# Patient Record
Sex: MALE | Race: WHITE | NOT HISPANIC OR LATINO | URBAN - METROPOLITAN AREA
[De-identification: names, ages, dates, MRNs, and addresses within clinical notes are randomized per-mention and may not be internally consistent; named-entity substitution may affect disease eponyms.]

---

## 2021-11-15 PROBLEM — E11.9: Noted: 2021-11-15

## 2021-11-15 PROBLEM — L71.9: Noted: 2021-11-15

## 2021-11-15 PROBLEM — H10.89: Noted: 2021-11-15

## 2021-11-15 PROBLEM — Z79.60: Noted: 2021-11-15

## 2022-03-19 PROBLEM — R07.9 CHEST PAIN: Status: ACTIVE | Noted: 2017-08-07

## 2022-03-19 PROBLEM — N20.0 NEPHROLITHIASIS: Status: ACTIVE | Noted: 2018-03-19

## 2022-11-14 ENCOUNTER — COMPREHENSIVE EXAM (OUTPATIENT)
Dept: URBAN - METROPOLITAN AREA CLINIC 1 | Facility: CLINIC | Age: 52
End: 2022-11-14

## 2022-11-14 DIAGNOSIS — Z79.60: ICD-10-CM

## 2022-11-14 DIAGNOSIS — H25.813: ICD-10-CM

## 2022-11-14 DIAGNOSIS — H10.89: ICD-10-CM

## 2022-11-14 DIAGNOSIS — E11.9: ICD-10-CM

## 2022-11-14 DIAGNOSIS — L71.9: ICD-10-CM

## 2022-11-14 PROCEDURE — 92015 DETERMINE REFRACTIVE STATE: CPT

## 2022-11-14 PROCEDURE — 92014 COMPRE OPH EXAM EST PT 1/>: CPT

## 2022-11-14 ASSESSMENT — TONOMETRY
OS_IOP_MMHG: 16
OD_IOP_MMHG: 16

## 2022-11-14 ASSESSMENT — VISUAL ACUITY
OS_CC: 20/20-1
OD_CC: 20/20

## 2024-10-01 ENCOUNTER — HOSPITAL ENCOUNTER (INPATIENT)
Facility: HOSPITAL | Age: 54
LOS: 1 days | Discharge: HOME | DRG: 700 | End: 2024-10-02
Attending: INTERNAL MEDICINE | Admitting: INTERNAL MEDICINE
Payer: COMMERCIAL

## 2024-10-01 DIAGNOSIS — R80.1 PERSISTENT PROTEINURIA: ICD-10-CM

## 2024-10-01 DIAGNOSIS — N18.2 CHRONIC KIDNEY DISEASE, STAGE II (MILD): ICD-10-CM

## 2024-10-01 DIAGNOSIS — G89.18 POST-OPERATIVE PAIN: Primary | ICD-10-CM

## 2024-10-01 PROBLEM — R80.9 ASYMPTOMATIC PROTEINURIA: Status: ACTIVE | Noted: 2024-10-01

## 2024-10-01 PROBLEM — N19 RENAL FAILURE: Status: ACTIVE | Noted: 2024-10-01

## 2024-10-01 LAB
ANION GAP SERPL CALC-SCNC: 6 MMOL/L (ref 3–18)
APTT PPP: 26.9 SEC (ref 23–36.4)
BASOPHILS # BLD: 0.1 K/UL (ref 0–0.1)
BASOPHILS NFR BLD: 1 % (ref 0–2)
BUN SERPL-MCNC: 25 MG/DL (ref 7–18)
BUN/CREAT SERPL: 24 (ref 12–20)
CALCIUM SERPL-MCNC: 9.3 MG/DL (ref 8.5–10.1)
CHLORIDE SERPL-SCNC: 103 MMOL/L (ref 100–111)
CO2 SERPL-SCNC: 27 MMOL/L (ref 21–32)
CREAT SERPL-MCNC: 1.03 MG/DL (ref 0.6–1.3)
DIFFERENTIAL METHOD BLD: ABNORMAL
EOSINOPHIL # BLD: 0.3 K/UL (ref 0–0.4)
EOSINOPHIL NFR BLD: 4 % (ref 0–5)
ERYTHROCYTE [DISTWIDTH] IN BLOOD BY AUTOMATED COUNT: 12 % (ref 11.6–14.5)
GLUCOSE BLD STRIP.AUTO-MCNC: 145 MG/DL (ref 70–110)
GLUCOSE BLD STRIP.AUTO-MCNC: 217 MG/DL (ref 70–110)
GLUCOSE SERPL-MCNC: 204 MG/DL (ref 74–99)
HCT VFR BLD AUTO: 49.5 % (ref 36–48)
HCT VFR BLD AUTO: 50.7 % (ref 36–48)
HGB BLD-MCNC: 16.9 G/DL (ref 13–16)
HGB BLD-MCNC: 17.4 G/DL (ref 13–16)
IMM GRANULOCYTES # BLD AUTO: 0 K/UL (ref 0–0.04)
IMM GRANULOCYTES NFR BLD AUTO: 1 % (ref 0–0.5)
INR PPP: 0.9 (ref 0.9–1.1)
LYMPHOCYTES # BLD: 2.1 K/UL (ref 0.9–3.6)
LYMPHOCYTES NFR BLD: 26 % (ref 21–52)
MCH RBC QN AUTO: 29 PG (ref 24–34)
MCHC RBC AUTO-ENTMCNC: 34.1 G/DL (ref 31–37)
MCV RBC AUTO: 84.9 FL (ref 78–100)
MONOCYTES # BLD: 0.6 K/UL (ref 0.05–1.2)
MONOCYTES NFR BLD: 7 % (ref 3–10)
NEUTS SEG # BLD: 5.2 K/UL (ref 1.8–8)
NEUTS SEG NFR BLD: 62 % (ref 40–73)
NRBC # BLD: 0 K/UL (ref 0–0.01)
NRBC BLD-RTO: 0 PER 100 WBC
PLATELET # BLD AUTO: 309 K/UL (ref 135–420)
PMV BLD AUTO: 9.6 FL (ref 9.2–11.8)
POTASSIUM SERPL-SCNC: 4 MMOL/L (ref 3.5–5.5)
PROTHROMBIN TIME: 12.3 SEC (ref 11.9–14.9)
RBC # BLD AUTO: 5.83 M/UL (ref 4.35–5.65)
SODIUM SERPL-SCNC: 136 MMOL/L (ref 136–145)
WBC # BLD AUTO: 8.3 K/UL (ref 4.6–13.2)

## 2024-10-01 PROCEDURE — G0378 HOSPITAL OBSERVATION PER HR: HCPCS

## 2024-10-01 PROCEDURE — 88346 IMFLUOR 1ST 1ANTB STAIN PX: CPT

## 2024-10-01 PROCEDURE — 2580000003 HC RX 258: Performed by: INTERNAL MEDICINE

## 2024-10-01 PROCEDURE — 2709999900 CT BIOPSY RENAL

## 2024-10-01 PROCEDURE — 82962 GLUCOSE BLOOD TEST: CPT

## 2024-10-01 PROCEDURE — 88348 ELECTRON MICROSCOPY DX: CPT

## 2024-10-01 PROCEDURE — 85610 PROTHROMBIN TIME: CPT

## 2024-10-01 PROCEDURE — 80048 BASIC METABOLIC PNL TOTAL CA: CPT

## 2024-10-01 PROCEDURE — 6370000000 HC RX 637 (ALT 250 FOR IP): Performed by: INTERNAL MEDICINE

## 2024-10-01 PROCEDURE — 88305 TISSUE EXAM BY PATHOLOGIST: CPT

## 2024-10-01 PROCEDURE — 6360000002 HC RX W HCPCS: Performed by: INTERNAL MEDICINE

## 2024-10-01 PROCEDURE — 0TB13ZX EXCISION OF LEFT KIDNEY, PERCUTANEOUS APPROACH, DIAGNOSTIC: ICD-10-PCS | Performed by: STUDENT IN AN ORGANIZED HEALTH CARE EDUCATION/TRAINING PROGRAM

## 2024-10-01 PROCEDURE — 85014 HEMATOCRIT: CPT

## 2024-10-01 PROCEDURE — 99223 1ST HOSP IP/OBS HIGH 75: CPT | Performed by: INTERNAL MEDICINE

## 2024-10-01 PROCEDURE — 88350 IMFLUOR EA ADDL 1ANTB STN PX: CPT

## 2024-10-01 PROCEDURE — 6360000002 HC RX W HCPCS: Performed by: STUDENT IN AN ORGANIZED HEALTH CARE EDUCATION/TRAINING PROGRAM

## 2024-10-01 PROCEDURE — 88313 SPECIAL STAINS GROUP 2: CPT

## 2024-10-01 PROCEDURE — 1100000000 HC RM PRIVATE

## 2024-10-01 PROCEDURE — 85025 COMPLETE CBC W/AUTO DIFF WBC: CPT

## 2024-10-01 PROCEDURE — 2580000003 HC RX 258: Performed by: RADIOLOGY

## 2024-10-01 PROCEDURE — 36415 COLL VENOUS BLD VENIPUNCTURE: CPT

## 2024-10-01 PROCEDURE — 85018 HEMOGLOBIN: CPT

## 2024-10-01 PROCEDURE — 85730 THROMBOPLASTIN TIME PARTIAL: CPT

## 2024-10-01 RX ORDER — GABAPENTIN 100 MG/1
100 CAPSULE ORAL 3 TIMES DAILY
Status: DISCONTINUED | OUTPATIENT
Start: 2024-10-01 | End: 2024-10-02 | Stop reason: HOSPADM

## 2024-10-01 RX ORDER — TAMSULOSIN HYDROCHLORIDE 0.4 MG/1
0.4 CAPSULE ORAL DAILY
Status: DISCONTINUED | OUTPATIENT
Start: 2024-10-01 | End: 2024-10-02 | Stop reason: HOSPADM

## 2024-10-01 RX ORDER — FENTANYL CITRATE 50 UG/ML
INJECTION, SOLUTION INTRAMUSCULAR; INTRAVENOUS PRN
Status: COMPLETED | OUTPATIENT
Start: 2024-10-01 | End: 2024-10-01

## 2024-10-01 RX ORDER — MAGNESIUM SULFATE IN WATER 40 MG/ML
2000 INJECTION, SOLUTION INTRAVENOUS PRN
Status: DISCONTINUED | OUTPATIENT
Start: 2024-10-01 | End: 2024-10-02 | Stop reason: HOSPADM

## 2024-10-01 RX ORDER — VITAMIN B COMPLEX
5000 TABLET ORAL DAILY
Status: DISCONTINUED | OUTPATIENT
Start: 2024-10-01 | End: 2024-10-02 | Stop reason: HOSPADM

## 2024-10-01 RX ORDER — ATORVASTATIN CALCIUM 40 MG/1
40 TABLET, FILM COATED ORAL DAILY
Status: DISCONTINUED | OUTPATIENT
Start: 2024-10-01 | End: 2024-10-02 | Stop reason: HOSPADM

## 2024-10-01 RX ORDER — POTASSIUM CHLORIDE 7.45 MG/ML
10 INJECTION INTRAVENOUS PRN
Status: DISCONTINUED | OUTPATIENT
Start: 2024-10-01 | End: 2024-10-02 | Stop reason: HOSPADM

## 2024-10-01 RX ORDER — ONDANSETRON 2 MG/ML
4 INJECTION INTRAMUSCULAR; INTRAVENOUS EVERY 6 HOURS PRN
Status: DISCONTINUED | OUTPATIENT
Start: 2024-10-01 | End: 2024-10-02 | Stop reason: HOSPADM

## 2024-10-01 RX ORDER — POLYETHYLENE GLYCOL 3350 17 G/17G
17 POWDER, FOR SOLUTION ORAL DAILY PRN
Status: DISCONTINUED | OUTPATIENT
Start: 2024-10-01 | End: 2024-10-02 | Stop reason: HOSPADM

## 2024-10-01 RX ORDER — AMLODIPINE BESYLATE 10 MG/1
10 TABLET ORAL DAILY
Status: DISCONTINUED | OUTPATIENT
Start: 2024-10-01 | End: 2024-10-02 | Stop reason: HOSPADM

## 2024-10-01 RX ORDER — SODIUM CHLORIDE 9 MG/ML
INJECTION, SOLUTION INTRAVENOUS CONTINUOUS
Status: DISCONTINUED | OUTPATIENT
Start: 2024-10-01 | End: 2024-10-01 | Stop reason: HOSPADM

## 2024-10-01 RX ORDER — DEXTROSE MONOHYDRATE 100 MG/ML
INJECTION, SOLUTION INTRAVENOUS CONTINUOUS PRN
Status: DISCONTINUED | OUTPATIENT
Start: 2024-10-01 | End: 2024-10-02 | Stop reason: HOSPADM

## 2024-10-01 RX ORDER — ACETAMINOPHEN 325 MG/1
650 TABLET ORAL EVERY 6 HOURS PRN
Status: DISCONTINUED | OUTPATIENT
Start: 2024-10-01 | End: 2024-10-02 | Stop reason: HOSPADM

## 2024-10-01 RX ORDER — ACETAMINOPHEN 650 MG/1
650 SUPPOSITORY RECTAL EVERY 6 HOURS PRN
Status: DISCONTINUED | OUTPATIENT
Start: 2024-10-01 | End: 2024-10-02 | Stop reason: HOSPADM

## 2024-10-01 RX ORDER — FENTANYL CITRATE 50 UG/ML
INJECTION, SOLUTION INTRAMUSCULAR; INTRAVENOUS
Status: DISCONTINUED
Start: 2024-10-01 | End: 2024-10-01

## 2024-10-01 RX ORDER — POTASSIUM CHLORIDE 1500 MG/1
40 TABLET, EXTENDED RELEASE ORAL PRN
Status: DISCONTINUED | OUTPATIENT
Start: 2024-10-01 | End: 2024-10-02 | Stop reason: HOSPADM

## 2024-10-01 RX ORDER — INSULIN LISPRO 100 [IU]/ML
0-4 INJECTION, SOLUTION INTRAVENOUS; SUBCUTANEOUS NIGHTLY
Status: DISCONTINUED | OUTPATIENT
Start: 2024-10-01 | End: 2024-10-02 | Stop reason: HOSPADM

## 2024-10-01 RX ORDER — CHLORTHALIDONE 25 MG/1
25 TABLET ORAL DAILY
Status: DISCONTINUED | OUTPATIENT
Start: 2024-10-01 | End: 2024-10-02 | Stop reason: HOSPADM

## 2024-10-01 RX ORDER — MIDAZOLAM HYDROCHLORIDE 2 MG/2ML
INJECTION, SOLUTION INTRAMUSCULAR; INTRAVENOUS PRN
Status: COMPLETED | OUTPATIENT
Start: 2024-10-01 | End: 2024-10-01

## 2024-10-01 RX ORDER — SODIUM CHLORIDE 0.9 % (FLUSH) 0.9 %
5-40 SYRINGE (ML) INJECTION PRN
Status: DISCONTINUED | OUTPATIENT
Start: 2024-10-01 | End: 2024-10-02 | Stop reason: HOSPADM

## 2024-10-01 RX ORDER — SODIUM CHLORIDE 0.9 % (FLUSH) 0.9 %
5-40 SYRINGE (ML) INJECTION EVERY 12 HOURS SCHEDULED
Status: DISCONTINUED | OUTPATIENT
Start: 2024-10-01 | End: 2024-10-02 | Stop reason: HOSPADM

## 2024-10-01 RX ORDER — LETROZOLE 2.5 MG/1
2.5 TABLET, FILM COATED ORAL DAILY
Status: DISCONTINUED | OUTPATIENT
Start: 2024-10-01 | End: 2024-10-02 | Stop reason: HOSPADM

## 2024-10-01 RX ORDER — INSULIN LISPRO 100 [IU]/ML
0-8 INJECTION, SOLUTION INTRAVENOUS; SUBCUTANEOUS
Status: DISCONTINUED | OUTPATIENT
Start: 2024-10-01 | End: 2024-10-02 | Stop reason: HOSPADM

## 2024-10-01 RX ORDER — HYDRALAZINE HYDROCHLORIDE 20 MG/ML
INJECTION INTRAMUSCULAR; INTRAVENOUS PRN
Status: COMPLETED | OUTPATIENT
Start: 2024-10-01 | End: 2024-10-01

## 2024-10-01 RX ORDER — SODIUM CHLORIDE 9 MG/ML
INJECTION, SOLUTION INTRAVENOUS PRN
Status: DISCONTINUED | OUTPATIENT
Start: 2024-10-01 | End: 2024-10-02 | Stop reason: HOSPADM

## 2024-10-01 RX ORDER — CLONIDINE HYDROCHLORIDE 0.1 MG/1
0.1 TABLET ORAL 2 TIMES DAILY
Status: DISCONTINUED | OUTPATIENT
Start: 2024-10-01 | End: 2024-10-02 | Stop reason: HOSPADM

## 2024-10-01 RX ORDER — DOCUSATE SODIUM 100 MG/1
100 CAPSULE, LIQUID FILLED ORAL DAILY
Status: DISCONTINUED | OUTPATIENT
Start: 2024-10-01 | End: 2024-10-02 | Stop reason: HOSPADM

## 2024-10-01 RX ORDER — HYDRALAZINE HYDROCHLORIDE 20 MG/ML
10 INJECTION INTRAMUSCULAR; INTRAVENOUS EVERY 6 HOURS PRN
Status: DISCONTINUED | OUTPATIENT
Start: 2024-10-01 | End: 2024-10-02 | Stop reason: HOSPADM

## 2024-10-01 RX ORDER — HYDRALAZINE HYDROCHLORIDE 20 MG/ML
INJECTION INTRAMUSCULAR; INTRAVENOUS
Status: DISCONTINUED
Start: 2024-10-01 | End: 2024-10-01

## 2024-10-01 RX ORDER — ONDANSETRON 4 MG/1
4 TABLET, ORALLY DISINTEGRATING ORAL EVERY 8 HOURS PRN
Status: DISCONTINUED | OUTPATIENT
Start: 2024-10-01 | End: 2024-10-02 | Stop reason: HOSPADM

## 2024-10-01 RX ORDER — OXYCODONE AND ACETAMINOPHEN 5; 325 MG/1; MG/1
1 TABLET ORAL EVERY 4 HOURS PRN
Status: DISCONTINUED | OUTPATIENT
Start: 2024-10-01 | End: 2024-10-02 | Stop reason: HOSPADM

## 2024-10-01 RX ORDER — MIDAZOLAM HYDROCHLORIDE 1 MG/ML
INJECTION INTRAMUSCULAR; INTRAVENOUS
Status: DISCONTINUED
Start: 2024-10-01 | End: 2024-10-01

## 2024-10-01 RX ADMIN — MIDAZOLAM HYDROCHLORIDE 1 MG: 1 INJECTION, SOLUTION INTRAMUSCULAR; INTRAVENOUS at 14:09

## 2024-10-01 RX ADMIN — MIDAZOLAM HYDROCHLORIDE 1 MG: 1 INJECTION, SOLUTION INTRAMUSCULAR; INTRAVENOUS at 14:05

## 2024-10-01 RX ADMIN — AMLODIPINE BESYLATE 10 MG: 10 TABLET ORAL at 16:36

## 2024-10-01 RX ADMIN — GABAPENTIN 100 MG: 100 CAPSULE ORAL at 20:57

## 2024-10-01 RX ADMIN — HYDRALAZINE HYDROCHLORIDE 10 MG: 20 INJECTION INTRAMUSCULAR; INTRAVENOUS at 18:12

## 2024-10-01 RX ADMIN — FENTANYL CITRATE 50 MCG: 50 INJECTION INTRAMUSCULAR; INTRAVENOUS at 14:09

## 2024-10-01 RX ADMIN — OXYCODONE HYDROCHLORIDE AND ACETAMINOPHEN 1 TABLET: 5; 325 TABLET ORAL at 18:12

## 2024-10-01 RX ADMIN — ACETAMINOPHEN 325MG 650 MG: 325 TABLET ORAL at 21:00

## 2024-10-01 RX ADMIN — HYDRALAZINE HYDROCHLORIDE 10 MG: 20 INJECTION, SOLUTION INTRAMUSCULAR; INTRAVENOUS at 14:16

## 2024-10-01 RX ADMIN — SODIUM CHLORIDE, PRESERVATIVE FREE 10 ML: 5 INJECTION INTRAVENOUS at 21:02

## 2024-10-01 RX ADMIN — FENTANYL CITRATE 50 MCG: 50 INJECTION INTRAMUSCULAR; INTRAVENOUS at 14:05

## 2024-10-01 RX ADMIN — SODIUM CHLORIDE: 9 INJECTION, SOLUTION INTRAVENOUS at 10:00

## 2024-10-01 RX ADMIN — CLONIDINE HYDROCHLORIDE 0.1 MG: 0.1 TABLET ORAL at 20:57

## 2024-10-01 ASSESSMENT — PAIN DESCRIPTION - ORIENTATION
ORIENTATION: RIGHT;LEFT
ORIENTATION: OTHER (COMMENT)
ORIENTATION: OTHER (COMMENT)

## 2024-10-01 ASSESSMENT — PAIN DESCRIPTION - LOCATION
LOCATION: HEAD
LOCATION: HEAD;NECK;SHOULDER
LOCATION: HEAD;NECK;SHOULDER

## 2024-10-01 ASSESSMENT — PAIN DESCRIPTION - PAIN TYPE
TYPE: ACUTE PAIN
TYPE: ACUTE PAIN

## 2024-10-01 ASSESSMENT — PAIN DESCRIPTION - DESCRIPTORS
DESCRIPTORS: THROBBING
DESCRIPTORS: ACHING;OTHER (COMMENT)
DESCRIPTORS: ACHING;OTHER (COMMENT)

## 2024-10-01 ASSESSMENT — PAIN - FUNCTIONAL ASSESSMENT
PAIN_FUNCTIONAL_ASSESSMENT: ACTIVITIES ARE NOT PREVENTED
PAIN_FUNCTIONAL_ASSESSMENT: 0-10

## 2024-10-01 ASSESSMENT — PAIN SCALES - GENERAL
PAINLEVEL_OUTOF10: 3
PAINLEVEL_OUTOF10: 6
PAINLEVEL_OUTOF10: 0
PAINLEVEL_OUTOF10: 0
PAINLEVEL_OUTOF10: 6
PAINLEVEL_OUTOF10: 6
PAINLEVEL_OUTOF10: 0

## 2024-10-01 ASSESSMENT — PAIN DESCRIPTION - ONSET
ONSET: PROGRESSIVE
ONSET: ON-GOING

## 2024-10-01 ASSESSMENT — PAIN DESCRIPTION - FREQUENCY: FREQUENCY: INTERMITTENT

## 2024-10-01 NOTE — PROGRESS NOTES
4 Eyes Skin Assessment     NAME:  Alfonzo Valencia  YOB: 1970  MEDICAL RECORD NUMBER:  [id number]    The patient is being assessed for  Admission    I agree that at least one RN has performed a thorough Head to Toe Skin Assessment on the patient. ALL assessment sites listed below have been assessed.      Areas assessed by both nurses:    Head, Face, Ears, Shoulders, Back, Chest, Arms, Elbows, Hands, Sacrum. Buttock, Coccyx, Ischium, and Legs. Feet and Heels        Does the Patient have a Wound? No noted wound(s)       Ramirez Prevention initiated by RN: Yes  Wound Care Orders initiated by RN: No    Pressure Injury (Stage 3,4, Unstageable, DTI, NWPT, and Complex wounds) if present, place Wound referral order by RN under : No    New Ostomies, if present place, Ostomy referral order under : No     Nurse 1 eSignature: Electronically signed by Zoie Glass RN on 10/1/24 at 7:21 PM EDT    **SHARE this note so that the co-signing nurse can place an eSignature**    Nurse 2 eSignature: Electronically signed by Eve Glass RN on 10/1/24 at 8:15 PM EDT

## 2024-10-01 NOTE — PLAN OF CARE
Problem: Chronic Conditions and Co-morbidities  Goal: Patient's chronic conditions and co-morbidity symptoms are monitored and maintained or improved  Outcome: Progressing     Problem: Pain  Goal: Verbalizes/displays adequate comfort level or baseline comfort level  Outcome: Progressing     Problem: Skin/Tissue Integrity  Goal: Absence of new skin breakdown  Description: 1.  Monitor for areas of redness and/or skin breakdown  2.  Assess vascular access sites hourly  3.  Every 4-6 hours minimum:  Change oxygen saturation probe site  4.  Every 4-6 hours:  If on nasal continuous positive airway pressure, respiratory therapy assess nares and determine need for appliance change or resting period.    Patient post op Random Renal Biopsy. Monitor access area for bleeding and breakdown  Outcome: Progressing     Problem: Hematologic - Adult  Goal: Maintains hematologic stability  Description: Patient post op from Random renal biopsy.    - Monitor site for Bleeding    - H/H Q6 for 3 occurrences per MD  Outcome: Progressing

## 2024-10-01 NOTE — H&P
History and Physical    Patient: Alfonzo Valencia MRN: [id number]  SSN: [id number]    YOB: 1970    Age: 54 y.o.  Sex: male    Olivia Andrews PA-C    C/C : S/p Renal Biopsy     Subjective:      Alfonzo Valencia is a 54 y.o. male with PMH of  CKD , Renal calculi , DM2, recently diagnosed with nephrotic range of proteinuria by his nephrologist Dr. Erickson , who recommended renal biopsy for further study hence patient received renal biopsy today will be admitting to monitor patient's progress.    Past Medical History:   Diagnosis Date    Bilateral nephrolithiasis     Diabetes mellitus, type II (HCC)     ED (erectile dysfunction)     Hypercholesterolemia     Hyperlipidemia     Hypertension     Hypogonadism male     Kidney calculi     Kidney stone      Past Surgical History:   Procedure Laterality Date    CT BIOPSY RENAL  10/1/2024    CT BIOPSY RENAL 10/1/2024 [facility] RAD CT    ORTHOPEDIC SURGERY      hand surgery    TONSILLECTOMY AND ADENOIDECTOMY      UROLOGICAL SURGERY  2018    S/p Left PCNL.     WISDOM TOOTH EXTRACTION        Family History   Problem Relation Age of Onset    No Known Problems Father     No Known Problems Mother      Social History     Tobacco Use    Smoking status: Former     Current packs/day: 0.00     Types: Cigarettes     Quit date: 2013     Years since quittin.7    Smokeless tobacco: Never   Substance Use Topics    Alcohol use: Yes      Prior to Admission medications    Medication Sig Start Date End Date Taking? Authorizing Provider   amLODIPine (NORVASC) 10 MG tablet Take 1 tablet by mouth daily   Yes Automatic Reconciliation, Ar   atorvastatin (LIPITOR) 40 MG tablet atorvastatin 40 mg tablet   TAKE 1 TABLET BY MOUTH ONCE DAILY   Yes Automatic Reconciliation, Ar   cloNIDine (CATAPRES) 0.1 MG tablet Take 1 tablet by mouth 2 times daily   Yes Automatic Reconciliation, Ar   letrozole (FEMARA) 2.5 MG tablet Take 1 tablet by mouth daily   Yes Automatic Reconciliation, Ar    metFORMIN (GLUCOPHAGE) 1000 MG tablet Take 1 tablet by mouth daily   Yes Automatic Reconciliation, Ar   aliskiren (TEKTURNA) 300 MG tablet Take 300 mg by mouth daily  Patient not taking: Reported on 10/1/2024 12/21/21   Automatic Reconciliation, Ar   anastrozole (ARIMIDEX) 1 MG tablet Take 1 tablet by mouth    Automatic Reconciliation, Ar   chlorthalidone (HYGROTON) 25 MG tablet Take 1 tablet by mouth once daily  Indications: prevention of calcium-containing kidney stones  Patient not taking: Reported on 10/1/2024 6/19/20   Automatic Reconciliation, Ar   vitamin D3 (CHOLECALCIFEROL) 125 MCG (5000 UT) TABS tablet Vitamin D3 125 mcg (5,000 unit) tablet   Take 1 tablet every day by oral route.    Automatic Reconciliation, Ar   dapagliflozin (FARXIGA) 10 MG tablet Take by mouth    Automatic Reconciliation, Ar   docusate (COLACE, DULCOLAX) 100 MG CAPS Take 100 mg by mouth daily  Patient not taking: Reported on 10/1/2024 12/29/21   Automatic Reconciliation, Ar   gabapentin (NEURONTIN) 100 MG capsule gabapentin 100 mg capsule  Patient not taking: Reported on 10/1/2024    Automatic Reconciliation, Ar   hydroCHLOROthiazide (MICROZIDE) 12.5 MG capsule Take 1 capsule by mouth daily    Automatic Reconciliation, Ar   sitaGLIPtan-metFORMIN (JANUMET)  MG per tablet Take 1 tablet by mouth 2 times daily (with meals)    Automatic Reconciliation, Ar   tamsulosin (FLOMAX) 0.4 MG capsule Take 0.4 mg by mouth  Patient not taking: Reported on 10/1/2024 10/15/21   Automatic Reconciliation, Ar        Allergies   Allergen Reactions    Codeine Itching and Rash       Review of Systems:  positive responses in bold type   Constitutional: Negative for fever, chills, diaphoresis and unexpected weight change.   HENT: Negative for ear pain, congestion, sore throat, rhinorrhea, drooling, trouble swallowing, neck pain and tinnitus.   Eyes: Negative for photophobia, pain, redness and visual disturbance.   Respiratory: negative for shortness of

## 2024-10-01 NOTE — PROCEDURES
PROCEDURE NOTE  Date: 10/1/2024   Name: Alfonzo Valencia  YOB: 1970        RADIOLOGY POST PROCEDURE NOTE     October 1, 2024       2:30 PM     Preoperative Diagnosis:   Renal dysfunction    Postoperative Diagnosis:  Same.    :  Philippe Martin MD    Assistant:  None.    Type of Anesthesia: 1% plain lidocaine, moderate sedation    Procedure/Description:  Image-guided random renal biopsy    Findings:   Successful left kidney random renal biopsy, 16g needle, 2 passes.    Estimated blood Loss:  Minimal    Specimen Removed:  Yes    Blood transfusions:  None.    Implants:  None.    Complications: Small left perinephric hematoma    Condition: Stable    Blood thinning medications: Hold for at least 24 hours    Discharge Plan:   Admit for overnight observation    Philippe Martin MD     [id number]

## 2024-10-01 NOTE — PROGRESS NOTES
TRANSFER - OUT REPORT:    Verbal report given to Zoie RIOS on Alfonzo Valencia  being transferred to [facility] for routine post-op       Report consisted of patient's Situation, Background, Assessment and   Recommendations(SBAR).     Information from the following report(s) Nurse Handoff Report was reviewed with the receiving nurse.           Lines:   Peripheral IV 10/01/24 Distal;Posterior;Right Forearm (Active)        Opportunity for questions and clarification was provided.      Patient transported with:  Registered Nurse and Tech

## 2024-10-01 NOTE — PROGRESS NOTES
4 Eyes Skin Assessment     NAME:  Alfonzo Valencia  YOB: 1970  MEDICAL RECORD NUMBER:  [id number]    The patient is being assessed for  Shift Handoff    I agree that at least one RN has performed a thorough Head to Toe Skin Assessment on the patient. ALL assessment sites listed below have been assessed.      Areas assessed by both nurses:    Head, Face, Ears, Shoulders, Back, Chest, Arms, Elbows, Hands, Sacrum. Buttock, Coccyx, Ischium, Legs. Feet and Heels, and Under Medical Devices         Does the Patient have a Wound? No noted wound(s)       Ramirez Prevention initiated by RN: Yes  Wound Care Orders initiated by RN: No    Pressure Injury (Stage 3,4, Unstageable, DTI, NWPT, and Complex wounds) if present, place Wound referral order by RN under : No    New Ostomies, if present place, Ostomy referral order under : No     Nurse 1 eSignature: Electronically signed by Zoie Glass RN on 10/1/24 at 7:21 PM EDT    **SHARE this note so that the co-signing nurse can place an eSignature**    Nurse 2 eSignature: {Esignature:[id number]}

## 2024-10-02 VITALS
BODY MASS INDEX: 32.25 KG/M2 | HEART RATE: 114 BPM | RESPIRATION RATE: 18 BRPM | WEIGHT: 205.47 LBS | TEMPERATURE: 99.1 F | SYSTOLIC BLOOD PRESSURE: 149 MMHG | DIASTOLIC BLOOD PRESSURE: 83 MMHG | OXYGEN SATURATION: 94 % | HEIGHT: 67 IN

## 2024-10-02 LAB
ANION GAP SERPL CALC-SCNC: 10 MMOL/L (ref 3–18)
BUN SERPL-MCNC: 24 MG/DL (ref 7–18)
BUN/CREAT SERPL: 21 (ref 12–20)
CALCIUM SERPL-MCNC: 9.1 MG/DL (ref 8.5–10.1)
CHLORIDE SERPL-SCNC: 106 MMOL/L (ref 100–111)
CO2 SERPL-SCNC: 23 MMOL/L (ref 21–32)
CREAT SERPL-MCNC: 1.16 MG/DL (ref 0.6–1.3)
GLUCOSE BLD STRIP.AUTO-MCNC: 259 MG/DL (ref 70–110)
GLUCOSE SERPL-MCNC: 273 MG/DL (ref 74–99)
HCT VFR BLD AUTO: 47.1 % (ref 36–48)
HGB BLD-MCNC: 16 G/DL (ref 13–16)
POTASSIUM SERPL-SCNC: 3.9 MMOL/L (ref 3.5–5.5)
SODIUM SERPL-SCNC: 139 MMOL/L (ref 136–145)

## 2024-10-02 PROCEDURE — 36415 COLL VENOUS BLD VENIPUNCTURE: CPT

## 2024-10-02 PROCEDURE — 80048 BASIC METABOLIC PNL TOTAL CA: CPT

## 2024-10-02 PROCEDURE — 94761 N-INVAS EAR/PLS OXIMETRY MLT: CPT

## 2024-10-02 PROCEDURE — 85018 HEMOGLOBIN: CPT

## 2024-10-02 PROCEDURE — 99239 HOSP IP/OBS DSCHRG MGMT >30: CPT | Performed by: INTERNAL MEDICINE

## 2024-10-02 PROCEDURE — 6360000002 HC RX W HCPCS: Performed by: INTERNAL MEDICINE

## 2024-10-02 PROCEDURE — 96374 THER/PROPH/DIAG INJ IV PUSH: CPT

## 2024-10-02 PROCEDURE — 6370000000 HC RX 637 (ALT 250 FOR IP): Performed by: INTERNAL MEDICINE

## 2024-10-02 PROCEDURE — G0378 HOSPITAL OBSERVATION PER HR: HCPCS

## 2024-10-02 PROCEDURE — 2580000003 HC RX 258: Performed by: INTERNAL MEDICINE

## 2024-10-02 PROCEDURE — 85014 HEMATOCRIT: CPT

## 2024-10-02 PROCEDURE — 82962 GLUCOSE BLOOD TEST: CPT

## 2024-10-02 RX ORDER — OXYCODONE AND ACETAMINOPHEN 5; 325 MG/1; MG/1
1 TABLET ORAL EVERY 8 HOURS PRN
Qty: 6 TABLET | Refills: 0 | Status: SHIPPED | OUTPATIENT
Start: 2024-10-02 | End: 2024-10-05

## 2024-10-02 RX ADMIN — INSULIN LISPRO 4 UNITS: 100 INJECTION, SOLUTION INTRAVENOUS; SUBCUTANEOUS at 09:28

## 2024-10-02 RX ADMIN — Medication 5000 UNITS: at 09:35

## 2024-10-02 RX ADMIN — GABAPENTIN 100 MG: 100 CAPSULE ORAL at 09:28

## 2024-10-02 RX ADMIN — DOCUSATE SODIUM 100 MG: 100 CAPSULE, LIQUID FILLED ORAL at 09:28

## 2024-10-02 RX ADMIN — CLONIDINE HYDROCHLORIDE 0.1 MG: 0.1 TABLET ORAL at 09:28

## 2024-10-02 RX ADMIN — HYDRALAZINE HYDROCHLORIDE 10 MG: 20 INJECTION INTRAMUSCULAR; INTRAVENOUS at 04:28

## 2024-10-02 RX ADMIN — ATORVASTATIN CALCIUM 40 MG: 40 TABLET, FILM COATED ORAL at 09:28

## 2024-10-02 RX ADMIN — SODIUM CHLORIDE, PRESERVATIVE FREE 10 ML: 5 INJECTION INTRAVENOUS at 09:41

## 2024-10-02 RX ADMIN — TAMSULOSIN HYDROCHLORIDE 0.4 MG: 0.4 CAPSULE ORAL at 09:28

## 2024-10-02 RX ADMIN — AMLODIPINE BESYLATE 10 MG: 10 TABLET ORAL at 09:28

## 2024-10-02 RX ADMIN — CHLORTHALIDONE 25 MG: 25 TABLET ORAL at 09:28

## 2024-10-02 RX ADMIN — LETROZOLE 2.5 MG: 2.5 TABLET ORAL at 09:36

## 2024-10-02 ASSESSMENT — PAIN SCALES - GENERAL
PAINLEVEL_OUTOF10: 0

## 2024-10-02 NOTE — CARE COORDINATION
D/C order noted for today. Orders reviewed. No needs identified at this time. Patient stated \"his truck is park in hospital parking lot, he will be driving hisself home.\" CM remains available if needed.         Dio Roman BSW  Case Management

## 2024-10-02 NOTE — CARE COORDINATION
10/02/24 1030   Service Assessment   Patient Orientation Alert and Oriented;Person;Place;Situation;Self   Cognition Alert   History Provided By Patient   Primary Caregiver Self   Accompanied By/Relationship Patient is currently alone in the room.   Support Systems Spouse/Significant Other;Children;Family Members   Patient's Healthcare Decision Maker is: Legal Next of Kin   PCP Verified by CM Yes  (Dr. Andrews)   Last Visit to PCP Within last 6 months   Prior Functional Level Independent in ADLs/IADLs   Current Functional Level Independent in ADLs/IADLs   Can patient return to prior living arrangement Yes   Ability to make needs known: Good   Family able to assist with home care needs: Yes   Would you like for me to discuss the discharge plan with any other family members/significant others, and if so, who? Yes  (Spouse)   Community Resources None   CM/SW Referral Other (see comment)  (Discharge Planning)   Social/Functional History   Lives With Spouse;Family   Type of Home House   Home Layout One level   Home Access Stairs to enter without rails   Entrance Stairs - Number of Steps 3   Bathroom Shower/Tub Tub/Shower unit   Bathroom Toilet Standard   Bathroom Equipment None   Bathroom Accessibility Accessible   Home Equipment None   Receives Help From Family   ADL Assistance Independent   Homemaking Assistance Independent   Homemaking Responsibilities Yes   Ambulation Assistance Independent   Transfer Assistance Independent   Active  Yes   Mode of Transportation Truck   Education Not Applicable   Occupation Self employed   Type of Occupation Not Applicable   Discharge Planning   Type of Residence House   Living Arrangements Spouse/Significant Other;Children;Family Members   Current Services Prior To Admission None   Potential Assistance Needed N/A   Potential Assistance Purchasing Medications No   Type of Home Care Services None   Patient expects to be discharged to: House   Follow Up Appointment: Best Day/Time     (TBD)   One/Two Story Residence One story   History of falls? 0   Services At/After Discharge   Transition of Care Consult (CM Consult) Discharge Planning   Services At/After Discharge None    Resource Information Provided? No   Mode of Transport at Discharge Self  (Truck is  in hospital parking lot)   Hospital Transport Time of Discharge   (TBD)   Condition of Participation: Discharge Planning   The Plan for Transition of Care is related to the following treatment goals: Anticipate to home with available resouces.   The Patient and/or Patient Representative was provided with a Choice of Provider? Patient   The Patient and/Or Patient Representative agree with the Discharge Plan? Yes   Freedom of Choice list was provided with basic dialogue that supports the patient's individualized plan of care/goals, treatment preferences, and shares the quality data associated with the providers?  No  (Patient decline Foc list.)   Documentation for Discharge Appeal   Discharge Appealed by Other (comment)  (Not Applicable)   Dio Roman BSW  Case Management

## 2024-10-02 NOTE — PROGRESS NOTES
4 Eyes Skin Assessment     NAME:  Aflonzo Valencia  YOB: 1970  MEDICAL RECORD NUMBER:  [id number]    The patient is being assessed for  Shift Handoff    I agree that at least one RN has performed a thorough Head to Toe Skin Assessment on the patient. ALL assessment sites listed below have been assessed.      Areas assessed by both nurses:    Head, Face, Ears, Shoulders, Back, Chest, Arms, Elbows, Hands, Sacrum. Buttock, Coccyx, Ischium, and Legs. Feet and Heels        Does the Patient have a Wound? No noted wound(s)       Ramirez Prevention initiated by RN: No  Wound Care Orders initiated by RN: No    Pressure Injury (Stage 3,4, Unstageable, DTI, NWPT, and Complex wounds) if present, place Wound referral order by RN under : No    New Ostomies, if present place, Ostomy referral order under : No     Nurse 1 eSignature: Electronically signed by Tho Og RN on 10/2/24 at 8:11 AM EDT    **SHARE this note so that the co-signing nurse can place an eSignature**    Nurse 2 eSignature: {Esignature:[id number]}

## 2024-10-02 NOTE — DISCHARGE INSTRUCTIONS
DISCHARGE SUMMARY from Nurse    PATIENT INSTRUCTIONS:    After general anesthesia or intravenous sedation, for 24 hours or while taking prescription Narcotics:  Limit your activities  Do not drive and operate hazardous machinery  Do not make important personal or business decisions  Do  not drink alcoholic beverages  If you have not urinated within 8 hours after discharge, please contact your surgeon on call.    Report the following to your surgeon:  Excessive pain, swelling, redness or odor of or around the surgical area  Temperature over 100.5  Nausea and vomiting lasting longer than 4 hours or if unable to take medications  Any signs of decreased circulation or nerve impairment to extremity: change in color, persistent  numbness, tingling, coldness or increase pain  Any questions    What to do at Home:  Recommended activity: activity as tolerated,     If you experience any of the following symptoms chest pain, nausea, vomiting, pain unrelieved by medication, please follow up with Nephrologist.    *  Please give a list of your current medications to your Primary Care Provider.    *  Please update this list whenever your medications are discontinued, doses are      changed, or new medications (including over-the-counter products) are added.    *  Please carry medication information at all times in case of emergency situations.    These are general instructions for a healthy lifestyle:    No smoking/ No tobacco products/ Avoid exposure to second hand smoke  Surgeon General's Warning:  Quitting smoking now greatly reduces serious risk to your health.    Obesity, smoking, and sedentary lifestyle greatly increases your risk for illness    A healthy diet, regular physical exercise & weight monitoring are important for maintaining a healthy lifestyle    You may be retaining fluid if you have a history of heart failure or if you experience any of the following symptoms:  Weight gain of 3 pounds or more overnight or 5  pounds in a week, increased swelling in our hands or feet or shortness of breath while lying flat in bed.  Please call your doctor as soon as you notice any of these symptoms; do not wait until your next office visit.    Patient armband removed and shredded   Thank you for enrolling in [facility]. Please follow the instructions below to securely access your online medical record. [facility] allows you to send messages to your doctor, view your test results, renew your prescriptions, schedule appointments, and more.     How Do I Sign Up?  In your Internet browser, go to [email or web address]  Click on the Sign Up Now link in the Sign In box. You will see the New Member Sign Up page.  Enter your [facility] Access Code exactly as it appears below. You will not need to use this code after you’ve completed the sign-up process. If you do not sign up before the expiration date, you must request a new code.  [facility] Access Code: [email or web address]  Expires: 10/7/2024 10:38 AM    Enter your Social Security Number ([id number]) and Date of Birth (mm/dd/yyyy) as indicated and click Submit. You will be taken to the next sign-up page.  Create a [facility] ID. This will be your [facility] login ID and cannot be changed, so think of one that is secure and easy to remember.  Create a [facility] password. You can change your password at any time.  Enter your Password Reset Question and Answer. This can be used at a later time if you forget your password.   Enter your e-mail address. You will receive e-mail notification when new information is available in [facility].  Click Sign Up. You can now view your medical record.     Additional Information  If you have questions, please contact your physician practice where you receive care. Remember, [facility] is NOT to be used for urgent needs. For medical emergencies, dial 911.   The discharge information has been reviewed with the {PATIENT PARENT GUARDIAN:[id number]}.  The {PATIENT PARENT

## 2024-10-02 NOTE — PROGRESS NOTES
Interventional Radiology Progress Note    Patient: Alfonzo Valencia               Sex: male          DOA: 10/1/2024       YOB: 1970      Age:  54 y.o.        LOS: 1 day       Assessment/Plan     Patient is POD#1 s/p left kidney biopsy by Dr. Martin.     1.  Continue management per primary team. Appreciate assistance  2. Hemoglobin stable. Keep biopsy site clean and dry. Change dressing as needed    From an IR standpoint, the patient is progressing appropriately without any apparent complications. Ok for discharge home from IR perspective     Subjective:     The patient endorses mild discomfort at the procedure site.     The patient denies N/V, HA, dizziness, fever, chills, abdominal pain, hematuria, or dyspnea    Objective:      Visit Vitals  BP (!) 143/72   Pulse 98   Temp 98.4 °F (36.9 °C) (Oral)   Resp 18   Ht 1.702 m (5' 7.01\")   Wt 93.2 kg (205 lb 7.5 oz)   SpO2 97%   BMI 32.17 kg/m²       Recent images:  Procedure images are available for review in PACS    Interval history:  Hemoglobin stable at 16  VSS    Physical Exam:  Constitutional: NAD. A&Ox4.  Respiratory: Normal respiratory effort. Symmetrical rise and fall of chest.    Cardiovascular: Regular rate.   Gastrointestinal: Soft, NT, ND.  Procedure site: CDI dressing left flank, no tenderness along site or surrounding skin changes     Thank you,  SRIDEVI Lazcano  [phone]

## 2024-10-02 NOTE — DISCHARGE SUMMARY
(H) 7.0 - 18 MG/DL    Creatinine 1.16 0.6 - 1.3 MG/DL    BUN/Creatinine Ratio 21 (H) 12 - 20      Est, Glom Filt Rate 74 >60 ml/min/1.73m2    Calcium 9.1 8.5 - 10.1 MG/DL   POCT Glucose    Collection Time: 10/02/24  6:55 AM   Result Value Ref Range    POC Glucose 259 (H) 70 - 110 mg/dL       CT BIOPSY RENAL    Result Date: 10/1/2024  CT BIOPSY RENAL : Lizandro Martin MD Indication:  Renal dysfunction. Preoperative Diagnosis:  Renal dysfunction. Postoperative Diagnosis: Same. Anesthesia: Local anesthesia with 1% lidocaine.  Moderate sedation with Versed and Fentanyl given. I personally monitored the patient face-to-face throughout the entire procedure. See detailed nursing records for precise medication dosing. Sedation time: 18 minutes Technique: The risks, benefits, and alternatives of the procedure were discussed with the patient. Verbal and written consent was obtained. Time-out was performed to confirm the correct patient, procedure, and site. With the patient prone, the skin was prepped and draped in usual sterile fashion. Maximum sterile barrier technique used. Local anesthesia was administered. Under CT guidance, a 15 gauge introducer was directed to the target through which 16 gauge cores were obtained. Tract embolization was performed with gelfoam slurry. Manual compression performed for hemostasis. Sterile dressing was applied. Postprocedure imaging was performed. Note: All CT scans at this facility are performed using dose optimization technique as appropriate to a performed exam, to include automated exposure control, adjustment of the mA and/or kV according to patient size (including appropriate matching for site-specific examinations), or use of iterative reconstruction technique. Specimen: 2 cores. Bedside evaluation performed by Pathology department. Estimated Blood Loss: Minimal Contrast: None Complications: Small left perinephric hematoma Drain/Implant: None Condition: Stable _______________  this discharge    Daniel Cabral MD   10/2/2024, 11:02 AM

## 2024-10-02 NOTE — PROGRESS NOTES
Alfonzo Valencia 54 y.o. male was discharged home on 10/02/24. Patient's armband removed and shredded. Discharge instructions were reviewed with patient, and he verbalized understanding. The patient was wheeled out to transportation vehicle by a staff member along with the patient's belongings. Transportation was provided by private vehicle.    FRANK TREADWELL RN